# Patient Record
Sex: MALE | Race: WHITE
[De-identification: names, ages, dates, MRNs, and addresses within clinical notes are randomized per-mention and may not be internally consistent; named-entity substitution may affect disease eponyms.]

---

## 2019-06-28 ENCOUNTER — HOSPITAL ENCOUNTER (OUTPATIENT)
Dept: HOSPITAL 92 - NM | Age: 34
Discharge: HOME | End: 2019-06-28
Attending: PHYSICIAN ASSISTANT
Payer: COMMERCIAL

## 2019-06-28 DIAGNOSIS — R10.11: Primary | ICD-10-CM

## 2019-06-28 PROCEDURE — 78227 HEPATOBIL SYST IMAGE W/DRUG: CPT

## 2019-06-28 PROCEDURE — A9537 TC99M MEBROFENIN: HCPCS

## 2019-06-28 NOTE — NM
HEPATOBILIARY SCAN:



HISTORY:Right upper quadrant pain



RADIOPHARMACEUTICAL: 5.4 mCi Technetium 99m Mebrofenin injected intravenously



FINDINGS:

There is normal tracer extraction by the liver with normal excretion into the biliary tracts and smal
l bowel loops and normal filling of the gallbladder.



The calculated gallbladder ejection fraction following an oral fatty meal measures 50%.



IMPRESSION:Normal exam.



Reported By: Adam Lees 

Electronically Signed:  6/28/2019 3:52 PM

## 2024-12-31 ENCOUNTER — HOSPITAL ENCOUNTER (OUTPATIENT)
Dept: HOSPITAL 92 - SCSRAD | Age: 39
Discharge: HOME | End: 2024-12-31
Payer: COMMERCIAL

## 2024-12-31 DIAGNOSIS — R00.2: ICD-10-CM

## 2024-12-31 DIAGNOSIS — R07.89: Primary | ICD-10-CM

## 2024-12-31 PROCEDURE — 71046 X-RAY EXAM CHEST 2 VIEWS: CPT
